# Patient Record
Sex: MALE | Race: WHITE
[De-identification: names, ages, dates, MRNs, and addresses within clinical notes are randomized per-mention and may not be internally consistent; named-entity substitution may affect disease eponyms.]

---

## 2018-12-09 ENCOUNTER — HOSPITAL ENCOUNTER (EMERGENCY)
Dept: HOSPITAL 47 - EC | Age: 33
Discharge: HOME | End: 2018-12-09
Payer: COMMERCIAL

## 2018-12-09 VITALS — SYSTOLIC BLOOD PRESSURE: 100 MMHG | HEART RATE: 89 BPM | DIASTOLIC BLOOD PRESSURE: 72 MMHG | TEMPERATURE: 99.2 F

## 2018-12-09 VITALS — RESPIRATION RATE: 18 BRPM

## 2018-12-09 DIAGNOSIS — D72.829: ICD-10-CM

## 2018-12-09 DIAGNOSIS — Z88.6: ICD-10-CM

## 2018-12-09 DIAGNOSIS — R00.0: ICD-10-CM

## 2018-12-09 DIAGNOSIS — B34.9: Primary | ICD-10-CM

## 2018-12-09 LAB
ALBUMIN SERPL-MCNC: 4.7 G/DL (ref 3.5–5)
ALP SERPL-CCNC: 39 U/L (ref 38–126)
ALT SERPL-CCNC: 27 U/L (ref 21–72)
ANION GAP SERPL CALC-SCNC: 12 MMOL/L
APTT BLD: 30.7 SEC (ref 22–30)
AST SERPL-CCNC: 39 U/L (ref 17–59)
BASOPHILS # BLD AUTO: 0.1 K/UL (ref 0–0.2)
BASOPHILS NFR BLD AUTO: 0 %
BUN SERPL-SCNC: 13 MG/DL (ref 9–20)
CALCIUM SPEC-MCNC: 9.4 MG/DL (ref 8.4–10.2)
CHLORIDE SERPL-SCNC: 102 MMOL/L (ref 98–107)
CO2 SERPL-SCNC: 25 MMOL/L (ref 22–30)
D DIMER PPP FEU-MCNC: 0.43 MG/L FEU (ref ?–0.6)
EOSINOPHIL # BLD AUTO: 0 K/UL (ref 0–0.7)
EOSINOPHIL NFR BLD AUTO: 0 %
ERYTHROCYTE [DISTWIDTH] IN BLOOD BY AUTOMATED COUNT: 5.68 M/UL (ref 4.3–5.9)
ERYTHROCYTE [DISTWIDTH] IN BLOOD: 13.6 % (ref 11.5–15.5)
GLUCOSE SERPL-MCNC: 110 MG/DL (ref 74–99)
HCT VFR BLD AUTO: 50.5 % (ref 39–53)
HGB BLD-MCNC: 17 GM/DL (ref 13–17.5)
INR PPP: 1 (ref ?–1.2)
KETONES UR QL STRIP.AUTO: (no result)
LIPASE SERPL-CCNC: 77 U/L (ref 23–300)
LYMPHOCYTES # SPEC AUTO: 0.8 K/UL (ref 1–4.8)
LYMPHOCYTES NFR SPEC AUTO: 6 %
MAGNESIUM SPEC-SCNC: 1.8 MG/DL (ref 1.6–2.3)
MCH RBC QN AUTO: 29.9 PG (ref 25–35)
MCHC RBC AUTO-ENTMCNC: 33.6 G/DL (ref 31–37)
MCV RBC AUTO: 88.9 FL (ref 80–100)
MONOCYTES # BLD AUTO: 0.7 K/UL (ref 0–1)
MONOCYTES NFR BLD AUTO: 5 %
NEUTROPHILS # BLD AUTO: 11.8 K/UL (ref 1.3–7.7)
NEUTROPHILS NFR BLD AUTO: 87 %
PH UR: 6.5 [PH] (ref 5–8)
PLATELET # BLD AUTO: 167 K/UL (ref 150–450)
POTASSIUM SERPL-SCNC: 4.5 MMOL/L (ref 3.5–5.1)
PROT SERPL-MCNC: 8 G/DL (ref 6.3–8.2)
PROT UR QL: (no result)
PT BLD: 10.9 SEC (ref 9–12)
RBC UR QL: 12 /HPF (ref 0–5)
SODIUM SERPL-SCNC: 139 MMOL/L (ref 137–145)
SP GR UR: 1.02 (ref 1–1.03)
UROBILINOGEN UR QL STRIP: 3 MG/DL (ref ?–2)
WBC # BLD AUTO: 13.5 K/UL (ref 3.8–10.6)

## 2018-12-09 PROCEDURE — 82550 ASSAY OF CK (CPK): CPT

## 2018-12-09 PROCEDURE — 83735 ASSAY OF MAGNESIUM: CPT

## 2018-12-09 PROCEDURE — 96361 HYDRATE IV INFUSION ADD-ON: CPT

## 2018-12-09 PROCEDURE — 83605 ASSAY OF LACTIC ACID: CPT

## 2018-12-09 PROCEDURE — 85730 THROMBOPLASTIN TIME PARTIAL: CPT

## 2018-12-09 PROCEDURE — 85025 COMPLETE CBC W/AUTO DIFF WBC: CPT

## 2018-12-09 PROCEDURE — 85379 FIBRIN DEGRADATION QUANT: CPT

## 2018-12-09 PROCEDURE — 87430 STREP A AG IA: CPT

## 2018-12-09 PROCEDURE — 84484 ASSAY OF TROPONIN QUANT: CPT

## 2018-12-09 PROCEDURE — 83880 ASSAY OF NATRIURETIC PEPTIDE: CPT

## 2018-12-09 PROCEDURE — 80053 COMPREHEN METABOLIC PANEL: CPT

## 2018-12-09 PROCEDURE — 87081 CULTURE SCREEN ONLY: CPT

## 2018-12-09 PROCEDURE — 81001 URINALYSIS AUTO W/SCOPE: CPT

## 2018-12-09 PROCEDURE — 85610 PROTHROMBIN TIME: CPT

## 2018-12-09 PROCEDURE — 96360 HYDRATION IV INFUSION INIT: CPT

## 2018-12-09 PROCEDURE — 71046 X-RAY EXAM CHEST 2 VIEWS: CPT

## 2018-12-09 PROCEDURE — 87502 INFLUENZA DNA AMP PROBE: CPT

## 2018-12-09 PROCEDURE — 83690 ASSAY OF LIPASE: CPT

## 2018-12-09 PROCEDURE — 93005 ELECTROCARDIOGRAM TRACING: CPT

## 2018-12-09 PROCEDURE — 36415 COLL VENOUS BLD VENIPUNCTURE: CPT

## 2018-12-09 PROCEDURE — 99284 EMERGENCY DEPT VISIT MOD MDM: CPT

## 2018-12-09 NOTE — ED
General Adult HPI





- General


Chief complaint: Fever


Stated complaint: Fever, Body Aches


Source: patient, RN notes reviewed, old records reviewed


Mode of arrival: ambulatory


Limitations: no limitations





- History of Present Illness


Initial comments: 


33-year-old male patient presents to ED with 3 day history of cough congestion, 

sinus pressure, sore throat.  Patient is a the cough is nonproductive.  Patient 

has had 1 day of substernal chest pressure.  Patient states that this is not 

affected by exertion.  Patient additionally complains of 3 days of fevers and 

chills.  Patient did not take a temperature at home.  Patient denies any 

cardiac history.  Patient denies pleuritic chest pain.  Patient denies nausea 

vomiting or diarrhea.  Patient denies abdominal pain.





Systemic: Pt denies fatigue, myalgia, rash. Pt denies weakness, night sweats, 

weight loss. 


Neuro: Pt denies visual disturbances, syncope or pre-syncope.


HEENT: Pt denies ocular discharge or irritation, otalgia, rhinorrhea, 

pharyngitis or notable lymphadenopathy. 


Cardiopulmonary: Pt denies SOB, heart palpitations, dyspnea on exertion.  


Abdominal/GI: Pt denies abdominal pain, n/v/d. 


: Pt denies dysuria, burning w/ urination, frequency/urgency. Denies new 

onset urinary or bowel incontinence.  


MSK: Pt denies myalgia, loss of strength or function in extremities. 


Neuro: Pt denies new onset weakness, paresthesias. 








- Related Data


 Home Medications











 Medication  Instructions  Recorded  Confirmed


 


Dm/Acetaminophen/Doxylamine [Vicks 30 ml PO Q6H PRN 12/09/18 12/09/18





Nyquil Cold-Flu Liquid]   


 


Phenylephrine/Dm/Acetaminop/GG 30 ml PO Q4H PRN 12/09/18 12/09/18





[Vicks Dayquil Severe Cold-Flu]   











 Allergies











Allergy/AdvReac Type Severity Reaction Status Date / Time


 


NSAIDS (Non-Steroidal AdvReac  Rash/Hives Verified 12/09/18 19:28





Anti-Inflamma     














Review of Systems


ROS Statement: 


Those systems with pertinent positive or pertinent negative responses have been 

documented in the HPI.





ROS Other: All systems not noted in ROS Statement are negative.





Past Medical History


Past Medical History: No Reported History


History of Any Multi-Drug Resistant Organisms: None Reported


Past Surgical History: No Surgical Hx Reported


Past Psychological History: No Psychological Hx Reported


Smoking Status: Never smoker


Past Alcohol Use History: Occasional


Past Drug Use History: None Reported





General Exam





- General Exam Comments


Initial Comments: 





Constitutional: NAD, AOX3, Pt has pleasant affect. 


HEENT: NC/AT, trachea midline, neck supple, no lymphadenopathy. Posterior 

pharynx non erythematous, without exudates. External ears appear normal, 

without discharge. Mucous membranes moist. Eyes PERRLA, EOM intact. There is no 

scleral icterus. No pallor noted. 


Cardiopulmonary: RRR, no murmurs, rubs or gallops, no JVD noted. Lungs CTAB in 

anterior and posterior fields. No peripheral edema. 


Abdominal exam: Abdomen soft and non-distended. Abdomen non-tender to palpation 

in all 4 quadrants. Bowel sounds active in LLQ. No hepatosplenomegaly. No 

ecchymosis


Neuro: CN II-XII grossly intact. No nuchal rigidity. 


MSK: No posterior calf tenderness bilaterally, homans sign negative 

bilaterally. Posterior tibialis and radial pulse +2 bilaterally. Sensation 

intact in upper and lower extremities. Full active ROM in upper and lower 

extremities, 5/5 stregnth. 





Limitations: no limitations





Course


 Vital Signs











  12/09/18 12/09/18 12/09/18





  19:19 19:37 19:40


 


Temperature 103 F H  


 


Pulse Rate 151 H  116 H


 


Respiratory 18  18





Rate   


 


Blood Pressure 119/81  124/78


 


O2 Sat by Pulse 98 96 97





Oximetry   














  12/09/18 12/09/18 12/09/18





  19:50 21:17 22:39


 


Temperature  102.9 F H 99.3 F


 


Pulse Rate  121 H 98


 


Respiratory 20 22 18





Rate   


 


Blood Pressure   


 


O2 Sat by Pulse  98 





Oximetry   














  12/09/18





  23:26


 


Temperature 99.2 F


 


Pulse Rate 89


 


Respiratory 18





Rate 


 


Blood Pressure 100/72


 


O2 Sat by Pulse 100





Oximetry 














Medical Decision Making





- Medical Decision Making


33-year-old male patient presents to ED with 3 day history of cough congestion, 

sinus pressure, sore throat.  Patient is a the cough is nonproductive.  Patient 

has had 1 day of substernal chest pressure.  Patient states that this is not 

affected by exertion.  Patient additionally complains of 3 days of fevers and 

chills.  Patient did not take a temperature at home.  Patient denies any 

cardiac history.  Patient denies pleuritic chest pain.  Patient denies nausea 

vomiting or diarrhea.  Patient denies abdominal pain.  Patient vital signs upon 

flank ED were tachycardic to 150, febrile to 103 degrees F.  Initial EKG upon 

presentation to ER was not concerning for acute ischemia.  Physical exam did 

not reveal acute pathology. Extensive laboratory investigations were conducted.

  CBC displayed a slight leukocytosis of 13.  CMP displayed a slight bump in 

creatinine.  Troponin was negative.  D-dimer is negative.  Lactic acid and 

lipase were within normal limits.  Vital swabs for group A strep, influenza 

were negative.  UA revealed 12 red blood cells.  Chest x-ray did not display 

any acute process.  Patient vital signs normalized with administration of 2 L 

of IV fluid, Tylenol.  Patient diagnosed with viral illness.  Repeat EKG didn't 

display any concerns for acute ischemia.  Patient to return to ED if new signs 

or symptoms develop, or condition worsens.  Specifically patient develops 

nausea vomiting diarrhea, abdominal pain, new chest pain, recent chest pain, 

difficulty breathing, any other new symptoms patient to return to ED. patient 

to use Tylenol to control fever at home as needed.  Patient to follow-up PCP in 

1-2 days.  Case discussed in depth with Dr. Joshi. 











- Lab Data


Result diagrams: 


 12/09/18 19:35





 12/09/18 19:35


 Lab Results











  12/09/18 12/09/18 12/09/18 Range/Units





  19:35 19:35 19:35 


 


WBC  13.5 H    (3.8-10.6)  k/uL


 


RBC  5.68    (4.30-5.90)  m/uL


 


Hgb  17.0    (13.0-17.5)  gm/dL


 


Hct  50.5    (39.0-53.0)  %


 


MCV  88.9    (80.0-100.0)  fL


 


MCH  29.9    (25.0-35.0)  pg


 


MCHC  33.6    (31.0-37.0)  g/dL


 


RDW  13.6    (11.5-15.5)  %


 


Plt Count  167    (150-450)  k/uL


 


Neutrophils %  87    %


 


Lymphocytes %  6    %


 


Monocytes %  5    %


 


Eosinophils %  0    %


 


Basophils %  0    %


 


Neutrophils #  11.8 H    (1.3-7.7)  k/uL


 


Lymphocytes #  0.8 L    (1.0-4.8)  k/uL


 


Monocytes #  0.7    (0-1.0)  k/uL


 


Eosinophils #  0.0    (0-0.7)  k/uL


 


Basophils #  0.1    (0-0.2)  k/uL


 


PT     (9.0-12.0)  sec


 


INR     (<1.2)  


 


APTT     (22.0-30.0)  sec


 


D-Dimer     (<0.60)  mg/L FEU


 


Sodium   139   (137-145)  mmol/L


 


Potassium   4.5   (3.5-5.1)  mmol/L


 


Chloride   102   ()  mmol/L


 


Carbon Dioxide   25   (22-30)  mmol/L


 


Anion Gap   12   mmol/L


 


BUN   13   (9-20)  mg/dL


 


Creatinine   1.34 H   (0.66-1.25)  mg/dL


 


Est GFR (CKD-EPI)AfAm   79   (>60 ml/min/1.73 sqM)  


 


Est GFR (CKD-EPI)NonAf   69   (>60 ml/min/1.73 sqM)  


 


Glucose   110 H   (74-99)  mg/dL


 


Plasma Lactic Acid Pio     (0.7-2.0)  mmol/L


 


Calcium   9.4   (8.4-10.2)  mg/dL


 


Magnesium   1.8   (1.6-2.3)  mg/dL


 


Total Bilirubin   1.5 H   (0.2-1.3)  mg/dL


 


AST   39   (17-59)  U/L


 


ALT   27   (21-72)  U/L


 


Alkaline Phosphatase   39   ()  U/L


 


Creatine Kinase     ()  U/L


 


Troponin I     (0.000-0.034)  ng/mL


 


NT-Pro-B Natriuret Pep     pg/mL


 


Total Protein   8.0   (6.3-8.2)  g/dL


 


Albumin   4.7   (3.5-5.0)  g/dL


 


Lipase   77   ()  U/L


 


Urine Color     


 


Urine Appearance     (Clear)  


 


Urine pH     (5.0-8.0)  


 


Ur Specific Gravity     (1.001-1.035)  


 


Urine Protein     (Negative)  


 


Urine Glucose (UA)     (Negative)  


 


Urine Ketones     (Negative)  


 


Urine Blood     (Negative)  


 


Urine Nitrite     (Negative)  


 


Urine Bilirubin     (Negative)  


 


Urine Urobilinogen     (<2.0)  mg/dL


 


Ur Leukocyte Esterase     (Negative)  


 


Urine RBC     (0-5)  /hpf


 


Urine WBC     (0-5)  /hpf


 


Amorphous Sediment     (None)  /hpf


 


Urine Mucus     (None)  /hpf


 


Influenza Type A RNA    Not Detected  (Not Detectd)  


 


Influenza Type B (PCR)    Not Detected  (Not Detectd)  


 


Group A Strep Rapid     (Negative)  














  12/09/18 12/09/18 12/09/18 Range/Units





  19:35 19:35 19:35 


 


WBC     (3.8-10.6)  k/uL


 


RBC     (4.30-5.90)  m/uL


 


Hgb     (13.0-17.5)  gm/dL


 


Hct     (39.0-53.0)  %


 


MCV     (80.0-100.0)  fL


 


MCH     (25.0-35.0)  pg


 


MCHC     (31.0-37.0)  g/dL


 


RDW     (11.5-15.5)  %


 


Plt Count     (150-450)  k/uL


 


Neutrophils %     %


 


Lymphocytes %     %


 


Monocytes %     %


 


Eosinophils %     %


 


Basophils %     %


 


Neutrophils #     (1.3-7.7)  k/uL


 


Lymphocytes #     (1.0-4.8)  k/uL


 


Monocytes #     (0-1.0)  k/uL


 


Eosinophils #     (0-0.7)  k/uL


 


Basophils #     (0-0.2)  k/uL


 


PT    10.9  (9.0-12.0)  sec


 


INR    1.0  (<1.2)  


 


APTT    30.7 H  (22.0-30.0)  sec


 


D-Dimer    0.43  (<0.60)  mg/L FEU


 


Sodium     (137-145)  mmol/L


 


Potassium     (3.5-5.1)  mmol/L


 


Chloride     ()  mmol/L


 


Carbon Dioxide     (22-30)  mmol/L


 


Anion Gap     mmol/L


 


BUN     (9-20)  mg/dL


 


Creatinine     (0.66-1.25)  mg/dL


 


Est GFR (CKD-EPI)AfAm     (>60 ml/min/1.73 sqM)  


 


Est GFR (CKD-EPI)NonAf     (>60 ml/min/1.73 sqM)  


 


Glucose     (74-99)  mg/dL


 


Plasma Lactic Acid Pio  1.4    (0.7-2.0)  mmol/L


 


Calcium     (8.4-10.2)  mg/dL


 


Magnesium     (1.6-2.3)  mg/dL


 


Total Bilirubin     (0.2-1.3)  mg/dL


 


AST     (17-59)  U/L


 


ALT     (21-72)  U/L


 


Alkaline Phosphatase     ()  U/L


 


Creatine Kinase     ()  U/L


 


Troponin I     (0.000-0.034)  ng/mL


 


NT-Pro-B Natriuret Pep     pg/mL


 


Total Protein     (6.3-8.2)  g/dL


 


Albumin     (3.5-5.0)  g/dL


 


Lipase     ()  U/L


 


Urine Color   Yellow   


 


Urine Appearance   Clear   (Clear)  


 


Urine pH   6.5   (5.0-8.0)  


 


Ur Specific Gravity   1.025   (1.001-1.035)  


 


Urine Protein   1+ H   (Negative)  


 


Urine Glucose (UA)   Negative   (Negative)  


 


Urine Ketones   1+ H   (Negative)  


 


Urine Blood   Small H   (Negative)  


 


Urine Nitrite   Negative   (Negative)  


 


Urine Bilirubin   Negative   (Negative)  


 


Urine Urobilinogen   3.0   (<2.0)  mg/dL


 


Ur Leukocyte Esterase   Negative   (Negative)  


 


Urine RBC   12 H   (0-5)  /hpf


 


Urine WBC   3   (0-5)  /hpf


 


Amorphous Sediment   Rare H   (None)  /hpf


 


Urine Mucus   Occasional H   (None)  /hpf


 


Influenza Type A RNA     (Not Detectd)  


 


Influenza Type B (PCR)     (Not Detectd)  


 


Group A Strep Rapid     (Negative)  














  12/09/18 12/09/18 12/09/18 Range/Units





  19:35 19:35 19:35 


 


WBC     (3.8-10.6)  k/uL


 


RBC     (4.30-5.90)  m/uL


 


Hgb     (13.0-17.5)  gm/dL


 


Hct     (39.0-53.0)  %


 


MCV     (80.0-100.0)  fL


 


MCH     (25.0-35.0)  pg


 


MCHC     (31.0-37.0)  g/dL


 


RDW     (11.5-15.5)  %


 


Plt Count     (150-450)  k/uL


 


Neutrophils %     %


 


Lymphocytes %     %


 


Monocytes %     %


 


Eosinophils %     %


 


Basophils %     %


 


Neutrophils #     (1.3-7.7)  k/uL


 


Lymphocytes #     (1.0-4.8)  k/uL


 


Monocytes #     (0-1.0)  k/uL


 


Eosinophils #     (0-0.7)  k/uL


 


Basophils #     (0-0.2)  k/uL


 


PT     (9.0-12.0)  sec


 


INR     (<1.2)  


 


APTT     (22.0-30.0)  sec


 


D-Dimer     (<0.60)  mg/L FEU


 


Sodium     (137-145)  mmol/L


 


Potassium     (3.5-5.1)  mmol/L


 


Chloride     ()  mmol/L


 


Carbon Dioxide     (22-30)  mmol/L


 


Anion Gap     mmol/L


 


BUN     (9-20)  mg/dL


 


Creatinine     (0.66-1.25)  mg/dL


 


Est GFR (CKD-EPI)AfAm     (>60 ml/min/1.73 sqM)  


 


Est GFR (CKD-EPI)NonAf     (>60 ml/min/1.73 sqM)  


 


Glucose     (74-99)  mg/dL


 


Plasma Lactic Acid Pio     (0.7-2.0)  mmol/L


 


Calcium     (8.4-10.2)  mg/dL


 


Magnesium     (1.6-2.3)  mg/dL


 


Total Bilirubin     (0.2-1.3)  mg/dL


 


AST     (17-59)  U/L


 


ALT     (21-72)  U/L


 


Alkaline Phosphatase     ()  U/L


 


Creatine Kinase     ()  U/L


 


Troponin I   <0.012   (0.000-0.034)  ng/mL


 


NT-Pro-B Natriuret Pep  48    pg/mL


 


Total Protein     (6.3-8.2)  g/dL


 


Albumin     (3.5-5.0)  g/dL


 


Lipase     ()  U/L


 


Urine Color     


 


Urine Appearance     (Clear)  


 


Urine pH     (5.0-8.0)  


 


Ur Specific Gravity     (1.001-1.035)  


 


Urine Protein     (Negative)  


 


Urine Glucose (UA)     (Negative)  


 


Urine Ketones     (Negative)  


 


Urine Blood     (Negative)  


 


Urine Nitrite     (Negative)  


 


Urine Bilirubin     (Negative)  


 


Urine Urobilinogen     (<2.0)  mg/dL


 


Ur Leukocyte Esterase     (Negative)  


 


Urine RBC     (0-5)  /hpf


 


Urine WBC     (0-5)  /hpf


 


Amorphous Sediment     (None)  /hpf


 


Urine Mucus     (None)  /hpf


 


Influenza Type A RNA     (Not Detectd)  


 


Influenza Type B (PCR)     (Not Detectd)  


 


Group A Strep Rapid    Negative  (Negative)  














  12/09/18 Range/Units





  19:35 


 


WBC   (3.8-10.6)  k/uL


 


RBC   (4.30-5.90)  m/uL


 


Hgb   (13.0-17.5)  gm/dL


 


Hct   (39.0-53.0)  %


 


MCV   (80.0-100.0)  fL


 


MCH   (25.0-35.0)  pg


 


MCHC   (31.0-37.0)  g/dL


 


RDW   (11.5-15.5)  %


 


Plt Count   (150-450)  k/uL


 


Neutrophils %   %


 


Lymphocytes %   %


 


Monocytes %   %


 


Eosinophils %   %


 


Basophils %   %


 


Neutrophils #   (1.3-7.7)  k/uL


 


Lymphocytes #   (1.0-4.8)  k/uL


 


Monocytes #   (0-1.0)  k/uL


 


Eosinophils #   (0-0.7)  k/uL


 


Basophils #   (0-0.2)  k/uL


 


PT   (9.0-12.0)  sec


 


INR   (<1.2)  


 


APTT   (22.0-30.0)  sec


 


D-Dimer   (<0.60)  mg/L FEU


 


Sodium   (137-145)  mmol/L


 


Potassium   (3.5-5.1)  mmol/L


 


Chloride   ()  mmol/L


 


Carbon Dioxide   (22-30)  mmol/L


 


Anion Gap   mmol/L


 


BUN   (9-20)  mg/dL


 


Creatinine   (0.66-1.25)  mg/dL


 


Est GFR (CKD-EPI)AfAm   (>60 ml/min/1.73 sqM)  


 


Est GFR (CKD-EPI)NonAf   (>60 ml/min/1.73 sqM)  


 


Glucose   (74-99)  mg/dL


 


Plasma Lactic Acid Pio   (0.7-2.0)  mmol/L


 


Calcium   (8.4-10.2)  mg/dL


 


Magnesium   (1.6-2.3)  mg/dL


 


Total Bilirubin   (0.2-1.3)  mg/dL


 


AST   (17-59)  U/L


 


ALT   (21-72)  U/L


 


Alkaline Phosphatase   ()  U/L


 


Creatine Kinase  67  ()  U/L


 


Troponin I   (0.000-0.034)  ng/mL


 


NT-Pro-B Natriuret Pep   pg/mL


 


Total Protein   (6.3-8.2)  g/dL


 


Albumin   (3.5-5.0)  g/dL


 


Lipase   ()  U/L


 


Urine Color   


 


Urine Appearance   (Clear)  


 


Urine pH   (5.0-8.0)  


 


Ur Specific Gravity   (1.001-1.035)  


 


Urine Protein   (Negative)  


 


Urine Glucose (UA)   (Negative)  


 


Urine Ketones   (Negative)  


 


Urine Blood   (Negative)  


 


Urine Nitrite   (Negative)  


 


Urine Bilirubin   (Negative)  


 


Urine Urobilinogen   (<2.0)  mg/dL


 


Ur Leukocyte Esterase   (Negative)  


 


Urine RBC   (0-5)  /hpf


 


Urine WBC   (0-5)  /hpf


 


Amorphous Sediment   (None)  /hpf


 


Urine Mucus   (None)  /hpf


 


Influenza Type A RNA   (Not Detectd)  


 


Influenza Type B (PCR)   (Not Detectd)  


 


Group A Strep Rapid   (Negative)  














- EKG Data


-: EKG Interpreted by Me (and dr joshi )


EKG Comments: 


1) 


Sinus tachycardia.  Ventricular 19, UT interval 120, QRS 92, QT/QTc through 12/

420.  No concerns for acute ischemia.





2) 


Ventricular rate 82, UT interval 136, .  QT/QTc is 372/44.  Normal sinus 

rhythm normal EKG.  No evidence for acute ischemia.








Disposition


Clinical Impression: 


 Viral illness





Disposition: HOME SELF-CARE


Condition: Good


Instructions:  Fever in Adults (ED), Viral Syndrome (ED)


Additional Instructions: 


Patient to adhere to previously discussed treatment plan and will take 

medication(s) as directed. Patient to follow up with PCP in 1-2 days. Patient 

to return to ED if symptoms do not improve. 


Is patient prescribed a controlled substance at d/c from ED?: No


Referrals: 


None,Stated [Primary Care Provider] - 1-2 days


Time of Disposition: 22:53

## 2018-12-09 NOTE — XR
EXAMINATION TYPE: XR chest 2V

 

DATE OF EXAM: 12/9/2018

 

COMPARISON: NONE

 

HISTORY: Short of breath and cough

 

TECHNIQUE:  Frontal and lateral views of the chest are obtained.

 

FINDINGS:  Heart and mediastinum are normal. Lungs are clear. Costophrenic angles are clear. Bony tho
rax is intact. There are chest leads.

 

IMPRESSION:  No active cardiopulmonary disease. Normal heart.